# Patient Record
Sex: FEMALE | URBAN - METROPOLITAN AREA
[De-identification: names, ages, dates, MRNs, and addresses within clinical notes are randomized per-mention and may not be internally consistent; named-entity substitution may affect disease eponyms.]

---

## 2022-05-26 ENCOUNTER — APPOINTMENT (RX ONLY)
Dept: URBAN - METROPOLITAN AREA CLINIC 368 | Facility: CLINIC | Age: 77
Setting detail: DERMATOLOGY
End: 2022-05-26

## 2022-05-26 DIAGNOSIS — L67.8 OTHER HAIR COLOR AND HAIR SHAFT ABNORMALITIES: ICD-10-CM

## 2022-05-26 PROCEDURE — ? TREATMENT REGIMEN

## 2022-05-26 PROCEDURE — 99202 OFFICE O/P NEW SF 15 MIN: CPT

## 2022-05-26 PROCEDURE — ? COUNSELING

## 2022-05-26 ASSESSMENT — LOCATION ZONE DERM: LOCATION ZONE: SCALP

## 2022-05-26 ASSESSMENT — LOCATION SIMPLE DESCRIPTION DERM
LOCATION SIMPLE: ANTERIOR SCALP
LOCATION SIMPLE: SCALP

## 2022-05-26 ASSESSMENT — LOCATION DETAILED DESCRIPTION DERM
LOCATION DETAILED: MID-FRONTAL SCALP
LOCATION DETAILED: LEFT CENTRAL PARIETAL SCALP

## 2022-05-26 NOTE — PROCEDURE: TREATMENT REGIMEN
Plan: [email or web address]
Otc Regimen: Pumpkin seed oil
Detail Level: Zone
Initiate Treatment: Recommend taking Nutrafol women’s balance daily.
Continue Regimen: Seeing hairstylist to get hair treatments.